# Patient Record
Sex: FEMALE | Race: WHITE | NOT HISPANIC OR LATINO | URBAN - METROPOLITAN AREA
[De-identification: names, ages, dates, MRNs, and addresses within clinical notes are randomized per-mention and may not be internally consistent; named-entity substitution may affect disease eponyms.]

---

## 2017-08-23 ENCOUNTER — OFFICE (OUTPATIENT)
Dept: URBAN - METROPOLITAN AREA CLINIC 12 | Facility: CLINIC | Age: 62
End: 2017-08-23

## 2017-08-23 VITALS
WEIGHT: 151 LBS | SYSTOLIC BLOOD PRESSURE: 132 MMHG | HEIGHT: 61 IN | HEART RATE: 112 BPM | DIASTOLIC BLOOD PRESSURE: 82 MMHG

## 2017-08-23 DIAGNOSIS — R19.7 DIARRHEA, UNSPECIFIED: ICD-10-CM

## 2017-08-23 DIAGNOSIS — R11.0 NAUSEA: ICD-10-CM

## 2017-08-23 DIAGNOSIS — R93.2 ABNORMAL FINDINGS ON DIAGNOSTIC IMAGING OF LIVER AND BILIARY: ICD-10-CM

## 2017-08-23 DIAGNOSIS — R94.5 ABNORMAL RESULTS OF LIVER FUNCTION STUDIES: ICD-10-CM

## 2017-08-23 DIAGNOSIS — K31.84 GASTROPARESIS: ICD-10-CM

## 2017-08-23 DIAGNOSIS — R10.9 UNSPECIFIED ABDOMINAL PAIN: ICD-10-CM

## 2017-08-23 PROCEDURE — 36415 COLL VENOUS BLD VENIPUNCTURE: CPT | Performed by: SPECIALIST

## 2017-08-23 PROCEDURE — 99243 OFF/OP CNSLTJ NEW/EST LOW 30: CPT | Performed by: SPECIALIST

## 2017-08-23 NOTE — SERVICEHPINOTES
Very nice lady who has struggled with bouts of n/v/ap for years.  Extensive w/u in [place].  Has prominent early satiety and post-prandial symptoms.  Relieved with fasting.  Loose BMs frequently too.  No overt bleeding.  W/U of gallbladder in [place] negative.  Gastric emptying study reportedly c/w gastroparesis.  EGD non-diagnostic.  No recollection of medical treatment for gastroparesis.  Moved to [place], [place] and last month had abrupt worsening of symptoms.  Labs revealed a new mild mixed pattern elevation of LFTs.  Subsequent diagnosis work-up demonstrated positive BANDAR and Anti-smooth muscle antibody.  LFTs this month normal.  HIDA scan with questionable filling issue but brisk GBEF.  U/S and CT non-diagnostic for liver or gallbladder issues.  Continues to have the same symptoms and they may last hours.

## 2017-08-23 NOTE — SERVICENOTES
Will retrieve GES results.  If positive, will offer her domperidone or reglan.  Her GI symptoms might reflect flare of gastroparesis.  I'm unsure if she has gallbladder disease as of yet.  Doubt autoimmune hepatitis since her repeat LFTs are normal and no evidence of liver disease on imaging.

## 2017-08-24 LAB
HEPATIC FUNCTION PANEL A: ALBUMIN: 4.4 G/DL (ref 3.5–5.2)
HEPATIC FUNCTION PANEL A: ALKALINE PHOSPHATASE: 131 U/L (ref 38–134)
HEPATIC FUNCTION PANEL A: DIRECT BILIRUBIN: 0.1 MG/DL (ref 0–0.2)
HEPATIC FUNCTION PANEL A: SGOT (AST): 11 U/L — LOW (ref 13–40)
HEPATIC FUNCTION PANEL A: SGPT (ALT): 7 U/L (ref 7–52)
HEPATIC FUNCTION PANEL A: TOTAL BILIRUBIN: 0.4 MG/DL (ref 0.3–1.2)
HEPATIC FUNCTION PANEL A: TOTAL PROTEIN: 7.4 G/DL (ref 6.4–8.3)
IMMUNOGLOBULIN A: 290 MG/DL (ref 70–400)
TISSUE TRANSGLUTAMINASE IGA AB: TISSUE TRANSGLUTAM IGA: <0.5 U/ML

## 2017-09-21 ENCOUNTER — OFFICE (OUTPATIENT)
Dept: URBAN - METROPOLITAN AREA CLINIC 9 | Facility: CLINIC | Age: 62
End: 2017-09-21

## 2017-09-21 PROCEDURE — G9199 DOC REASON FOR NO VTE: HCPCS | Performed by: SPECIALIST
